# Patient Record
Sex: FEMALE | Race: WHITE
[De-identification: names, ages, dates, MRNs, and addresses within clinical notes are randomized per-mention and may not be internally consistent; named-entity substitution may affect disease eponyms.]

---

## 2018-05-17 ENCOUNTER — HOSPITAL ENCOUNTER (OUTPATIENT)
Dept: HOSPITAL 95 - PLD | Age: 63
End: 2018-05-17
Attending: DERMATOLOGY
Payer: COMMERCIAL

## 2018-05-17 DIAGNOSIS — L98.9: Primary | ICD-10-CM

## 2023-05-15 NOTE — NUR
05/15/23 140Marine Jensen
30ML OF ROPIVACAINE 0.5% MIXED AND VERIFIED WITH 0.15ML OF EPI
(1MG/ML) TO MAKE ROPIVACAINE 0.5% WITH EPI 1:200,000 FOR INJECTION AT
[facility] BY DR HASKINS.